# Patient Record
Sex: MALE | URBAN - METROPOLITAN AREA
[De-identification: names, ages, dates, MRNs, and addresses within clinical notes are randomized per-mention and may not be internally consistent; named-entity substitution may affect disease eponyms.]

---

## 2018-11-06 ENCOUNTER — APPOINTMENT (RX ONLY)
Dept: URBAN - METROPOLITAN AREA CLINIC 82 | Facility: CLINIC | Age: 68
Setting detail: DERMATOLOGY
End: 2018-11-06

## 2018-11-06 DIAGNOSIS — R21 RASH AND OTHER NONSPECIFIC SKIN ERUPTION: ICD-10-CM

## 2018-11-06 PROBLEM — E13.9 OTHER SPECIFIED DIABETES MELLITUS WITHOUT COMPLICATIONS: Status: ACTIVE | Noted: 2018-11-06

## 2018-11-06 PROBLEM — D48.5 NEOPLASM OF UNCERTAIN BEHAVIOR OF SKIN: Status: ACTIVE | Noted: 2018-11-06

## 2018-11-06 PROCEDURE — 99212 OFFICE O/P EST SF 10 MIN: CPT | Mod: 25

## 2018-11-06 PROCEDURE — 11100: CPT

## 2018-11-06 PROCEDURE — ? ORDER TESTS

## 2018-11-06 PROCEDURE — ? PATIENT SPECIFIC COUNSELING

## 2018-11-06 PROCEDURE — ? BIOPSY BY SHAVE METHOD

## 2018-11-06 NOTE — PROCEDURE: ORDER TESTS
Performing Laboratory: [id number]
Expected Date Of Service: 11/06/2018
Billing Type: Third-Party Bill
Bill For Surgical Tray: no

## 2018-11-06 NOTE — PROCEDURE: BIOPSY BY SHAVE METHOD
Detail Level: Detailed
Notification Instructions: Patient will be notified of biopsy results. However, patient instructed to call the office if not contacted within 2 weeks.
Render Post-Care Instructions In Note?: no
Electrodesiccation And Curettage Text: The wound bed was treated with electrodesiccation and curettage after the biopsy was performed.
Lab: [id number]
Additional Anesthesia Volume In Cc (Will Not Render If 0): 0
Post-Care Instructions: I reviewed with the patient in detail post-care instructions. Patient is to keep the biopsy site dry overnight, and then apply bacitracin twice daily until healed. Patient may apply hydrogen peroxide soaks to remove any crusting.
Biopsy Method: Dermablade
Consent: Written consent was obtained and risks were reviewed including but not limited to scarring, infection, bleeding, scabbing, incomplete removal, nerve damage and allergy to anesthesia.
Size Of Lesion In Cm: 1.1
Electrodesiccation Text: The wound bed was treated with electrodesiccation after the biopsy was performed.
Dressing: bandage
Was A Bandage Applied: Yes
Hemostasis: Drysol
Anesthesia Type: 1% lidocaine with epinephrine
Type Of Destruction Used: Curettage
Depth Of Biopsy: dermis
Wound Care: Petrolatum
Anesthesia Volume In Cc (Will Not Render If 0): 0.5
Silver Nitrate Text: The wound bed was treated with silver nitrate after the biopsy was performed.
Curettage Text: The wound bed was treated with curettage after the biopsy was performed.
Billing Type: Third-Party Bill
Cryotherapy Text: The wound bed was treated with cryotherapy after the biopsy was performed.
Lab Facility: [id number]
Biopsy Type: H and E

## 2018-11-06 NOTE — PROCEDURE: MIPS QUALITY
Quality 431: Preventive Care And Screening: Unhealthy Alcohol Use - Screening: Patient screened for unhealthy alcohol use using a single question and scores less than 2 times per year
Detail Level: Detailed
Quality 226: Preventive Care And Screening: Tobacco Use: Screening And Cessation Intervention: Tobacco Screening not Performed for Medical Reasons
Quality 110: Preventive Care And Screening: Influenza Immunization: Influenza Immunization Administered during Influenza season
Quality 111:Pneumonia Vaccination Status For Older Adults: Pneumococcal Vaccination Administered
normal balance

## 2018-11-06 NOTE — HPI: FOLLOW-UP
What Is The Condition That You Are Returning For Follow-Up?: other
Additional History: Pt is using mupirocin and lukasz-synalar BID

## 2018-11-06 NOTE — PROCEDURE: PATIENT SPECIFIC COUNSELING
Non healing wound unresponsive to keflex. Ddx infectious (mrsa vs atypical vs fungal) vs nmsc. Less likely atypical infection (leishmaniasis). Will do shave to r/o nmsc with fungal and bacterial cx today and in 2 weeks if shave is nondiagnostic will do punch w tissue cx.
Detail Level: Simple

## 2018-11-20 ENCOUNTER — APPOINTMENT (RX ONLY)
Dept: URBAN - METROPOLITAN AREA CLINIC 82 | Facility: CLINIC | Age: 68
Setting detail: DERMATOLOGY
End: 2018-11-20

## 2018-11-20 DIAGNOSIS — L93.0 DISCOID LUPUS ERYTHEMATOSUS: ICD-10-CM

## 2018-11-20 PROBLEM — L30.9 DERMATITIS, UNSPECIFIED: Status: ACTIVE | Noted: 2018-11-20

## 2018-11-20 PROCEDURE — ? BIOPSY BY PUNCH METHOD

## 2018-11-20 PROCEDURE — 11101: CPT

## 2018-11-20 PROCEDURE — ? ADDITIONAL NOTES

## 2018-11-20 PROCEDURE — 11100: CPT

## 2018-11-20 PROCEDURE — ? BIOPSY BY PUNCH METHOD FOR DIF

## 2018-11-20 ASSESSMENT — LOCATION ZONE DERM: LOCATION ZONE: SCALP

## 2018-11-20 ASSESSMENT — LOCATION SIMPLE DESCRIPTION DERM: LOCATION SIMPLE: SCALP

## 2018-11-20 ASSESSMENT — LOCATION DETAILED DESCRIPTION DERM: LOCATION DETAILED: LEFT CENTRAL FRONTAL SCALP

## 2018-11-20 NOTE — PROCEDURE: ADDITIONAL NOTES
Additional Notes: The lesion was dissected in half; one part in formalin and one in DIF. The pt was encouraged to continue topical mupirocin and further tx will be discussed at the follow-up. [facility] confirmation number: [id number]
Detail Level: Simple

## 2018-11-20 NOTE — HPI: FOLLOW-UP
What Is The Condition That You Are Returning For Follow-Up?: abscess
During The Previous Visit, The Patient......?: Had a bx performed and cultures

## 2018-11-20 NOTE — PROCEDURE: BIOPSY BY PUNCH METHOD
Was A Bandage Applied: Yes
Notification Instructions: Patient will be notified of biopsy results. However, patient instructed to call the office if not contacted within 2 weeks.
Post-Care Instructions: I reviewed with the patient in detail post-care instructions. Patient is to keep the biopsy site dry overnight, and then apply Vaseline twice daily until healed.
Anesthesia Volume In Cc (Will Not Render If 0): 0.5
X Depth Of Punch In Cm (Optional): 0
Billing Type: Third-Party Bill
Anesthesia Type: 1% lidocaine with epinephrine
Biopsy Type: H and E
Bill For Surgical Tray: no
X Size Of Lesion In Cm (Optional): 3
Lab Facility: [id number]
Hemostasis: None
Consent: Written consent was obtained and risks were reviewed including but not limited to scarring, infection, bleeding, scabbing, recurrence, incomplete removal, nerve damage (including numbness and weakness) and allergy to anesthesia.
Home Suture Removal Text: Patient was provided a home suture removal kit and will remove their sutures at home.  If they have any questions or difficulties they will call the office.
Wound Care: Vaseline
Punch Size In Mm: 5
Lab: [id number]
Dressing: bandage
Suture Removal: 14 days
Epidermal Sutures: 4-0 Prolene
Detail Level: Detailed

## 2018-11-20 NOTE — PROCEDURE: BIOPSY BY PUNCH METHOD FOR DIF
Epidermal Sutures: 4-0 Ethilon
Home Suture Removal Text: Patient was provided a home suture removal kit and will remove their sutures at home.  If they have any questions or difficulties they will call the office.
Punch Size In Mm: 4
Hemostasis: None
Bill 34260 For Specimen Handling/Conveyance To Laboratory?: no
X Size Of Lesion In Cm (Optional): 0
Anesthesia Volume In Cc (Will Not Render If 0): 0.5
Biopsy Type: DIF (perilesional)
Notification Instructions: Patient will be notified of biopsy results. However, patient instructed to call the office if not contacted within 2 weeks.
Anesthesia Type: 1% lidocaine with epinephrine
Billing Type: Third-Party Bill
Post-Care Instructions: I reviewed with the patient in detail post-care instructions. Patient is to keep the biopsy site dry overnight, and then apply bacitracin twice daily until healed. Patient may apply hydrogen peroxide soaks to remove any crusting.
Detail Level: Detailed
Suture Removal: 14 days
Wound Care: Petrolatum
Was A Bandage Applied: Yes
Dressing: bandage
Consent: Written consent was obtained and risks were reviewed including but not limited to scarring, infection, bleeding, scabbing, incomplete removal, nerve damage and allergy to anesthesia.

## 2018-11-27 ENCOUNTER — APPOINTMENT (RX ONLY)
Dept: URBAN - METROPOLITAN AREA CLINIC 82 | Facility: CLINIC | Age: 68
Setting detail: DERMATOLOGY
End: 2018-11-27

## 2018-11-27 DIAGNOSIS — L30.9 DERMATITIS, UNSPECIFIED: ICD-10-CM

## 2018-11-27 PROCEDURE — ? DIAGNOSIS COMMENT

## 2018-11-27 PROCEDURE — 99212 OFFICE O/P EST SF 10 MIN: CPT

## 2018-11-27 PROCEDURE — ? COUNSELING

## 2018-11-27 PROCEDURE — ? PRESCRIPTION

## 2018-11-27 RX ORDER — CLOBETASOL PROPIONATE 0.5 MG/G
OINTMENT TOPICAL
Qty: 1 | Refills: 2 | Status: ERX | COMMUNITY
Start: 2018-11-27

## 2018-11-27 RX ADMIN — CLOBETASOL PROPIONATE: 0.5 OINTMENT TOPICAL at 20:09

## 2018-11-27 NOTE — PROCEDURE: DIAGNOSIS COMMENT
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 Of note, there are a few case reports in the literature of solitary plaques of this condition. Patient denies any ocular or mucosal involvement. No family history of similar eruptions. Denies any involvement of new areas. Discussed ramifications of this autoimmune condition at length with patient. Given localized involvement, will treat with potent topical steroids with close follow up. Counseled patient to call our clinic should he develop any acute worsening or involvement of ocular/oral mucosa. Would have low threshold for stepping up the therapeutic ladder.
Detail Level: Simple